# Patient Record
Sex: FEMALE | Race: OTHER | HISPANIC OR LATINO | URBAN - METROPOLITAN AREA
[De-identification: names, ages, dates, MRNs, and addresses within clinical notes are randomized per-mention and may not be internally consistent; named-entity substitution may affect disease eponyms.]

---

## 2017-05-27 ENCOUNTER — EMERGENCY (EMERGENCY)
Facility: HOSPITAL | Age: 27
LOS: 1 days | Discharge: HOME | End: 2017-05-27
Attending: EMERGENCY MEDICINE | Admitting: EMERGENCY MEDICINE
Payer: COMMERCIAL

## 2017-05-27 DIAGNOSIS — J45.901 UNSPECIFIED ASTHMA WITH (ACUTE) EXACERBATION: ICD-10-CM

## 2017-05-27 DIAGNOSIS — Z79.899 OTHER LONG TERM (CURRENT) DRUG THERAPY: ICD-10-CM

## 2017-05-27 PROCEDURE — 99053 MED SERV 10PM-8AM 24 HR FAC: CPT

## 2017-05-27 PROCEDURE — 99284 EMERGENCY DEPT VISIT MOD MDM: CPT | Mod: 25

## 2017-05-28 VITALS
SYSTOLIC BLOOD PRESSURE: 123 MMHG | WEIGHT: 147.71 LBS | RESPIRATION RATE: 18 BRPM | HEIGHT: 60 IN | OXYGEN SATURATION: 98 % | TEMPERATURE: 99 F | DIASTOLIC BLOOD PRESSURE: 75 MMHG | HEART RATE: 78 BPM

## 2017-05-28 PROCEDURE — 99283 EMERGENCY DEPT VISIT LOW MDM: CPT | Mod: 25

## 2017-05-28 PROCEDURE — 94640 AIRWAY INHALATION TREATMENT: CPT

## 2017-05-28 RX ORDER — ALBUTEROL 90 UG/1
3 AEROSOL, METERED ORAL
Qty: 0 | Refills: 0 | COMMUNITY

## 2017-05-28 RX ORDER — ALBUTEROL 90 UG/1
2 AEROSOL, METERED ORAL
Qty: 1 | Refills: 0 | OUTPATIENT
Start: 2017-05-28 | End: 2017-06-04

## 2017-05-28 RX ORDER — IPRATROPIUM/ALBUTEROL SULFATE 18-103MCG
3 AEROSOL WITH ADAPTER (GRAM) INHALATION ONCE
Qty: 0 | Refills: 0 | Status: COMPLETED | OUTPATIENT
Start: 2017-05-28 | End: 2017-05-28

## 2017-05-28 RX ADMIN — Medication 60 MILLIGRAM(S): at 00:21

## 2017-05-28 RX ADMIN — Medication 3 MILLILITER(S): at 00:21

## 2017-05-28 NOTE — ED ADULT NURSE NOTE - OBJECTIVE STATEMENT
Patient to the ED for worsening SOB and difficulty breathing beginning 4 days ago.  Patient has history of asthma.  Associated symptoms to include back pain secondary to forcing to breath deeply.  Denies fever chills chest pain.

## 2017-05-28 NOTE — ED PROVIDER NOTE - MEDICAL DECISION MAKING DETAILS
asthma exac  pk flow 370 - states subjective fever -pollen getting to her asthma exac  pk flow 370 - states subjective fever -pollen getting to her much improved in ED will dc  pk flow > 450  no further wheezing

## 2017-05-28 NOTE — ED PROVIDER NOTE - OBJECTIVE STATEMENT
28 yo female with hx of asthma- 1 prior intubation 1 month ago-  no current steroids- now with 1 day of wheezing cough - clear sputum no cp or sob  no leg swelling or calf tenderness  no prior hx of DVT or PE  non smoker -was using her inhaler all day

## 2018-08-27 NOTE — ED ADULT NURSE NOTE - PAIN: PRESENCE, MLM
complains of pain/discomfort Principal Discharge DX:	Viral pneumonia  Goal:	symptom management, follow up with primary care doctor within 1 week of discharge  Assessment and plan of treatment:	You were admitted to the hospital for a productive cough and fever and found to have a viral respiratory illness called entero/rhinovirus. This was the cause of all of your symptoms. Because of the viral illness you were having wheezing and shortness of breath relieved with inhalers. You were prescribed these inhalers for home to help with symptoms until your respiratory illness resolves. Please use these as prescribed and follow up with your primary care doctor within 1 weeks of discharge for further monitoring.  Secondary Diagnosis:	SLE (systemic lupus erythematosus)  Goal:	follow up with primary care doctor within 1 week of discharge  Assessment and plan of treatment:	According to your pharmacy the last time your plaquenil was filled was in June.  Secondary Diagnosis:	HTN (hypertension)  Goal:	follow up with primary care doctor within 1 week of discharge  Secondary Diagnosis:	Depression  Goal:	follow up with primary care doctor within 1 week of discharge Principal Discharge DX:	Viral pneumonia  Goal:	symptom management, follow up with primary care doctor within 1 week of discharge  Assessment and plan of treatment:	You were admitted to the hospital for a productive cough and fever and found to have a viral respiratory illness called entero/rhinovirus. This was the cause of all of your symptoms. Because of the viral illness you were having wheezing and shortness of breath relieved with inhalers. You were prescribed these inhalers for home to help with symptoms until your respiratory illness resolves. Please use these as prescribed and follow up with your primary care doctor within 1 weeks of discharge for further monitoring.  Secondary Diagnosis:	SLE (systemic lupus erythematosus)  Goal:	follow up with primary care doctor within 1 week of discharge  Assessment and plan of treatment:	Please continue to take your plaquenil  Secondary Diagnosis:	HTN (hypertension)  Goal:	follow up with primary care doctor within 1 week of discharge  Assessment and plan of treatment:	Please continue to take your home medications as prescribed (enalapril, metoprolol) and follow a low salt diet. Please follow up with your primary care doctor within 1 week of discharge for further monitoring and adjustment of your medications.  Secondary Diagnosis:	Depression  Goal:	follow up with primary care doctor within 1 week of discharge  Assessment and plan of treatment:	Please continue to take your medications as prescribed. If you experience any increased symptoms, have any thoughts of hurting yourself or others please contact your doctor immediately or come to the hospital. Principal Discharge DX:	Viral pneumonia  Goal:	symptom management, follow up with primary care doctor within 1 week of discharge  Assessment and plan of treatment:	You were admitted to the hospital for a productive cough and fever and found to have a viral respiratory illness called entero/rhinovirus. This was the cause of all of your symptoms. Because of the viral illness you were having wheezing and shortness of breath relieved with inhalers. You were prescribed these inhalers for home to help with symptoms until your respiratory illness resolves. Please use these as prescribed and follow up with your primary care doctor within 1 weeks of discharge for further monitoring.  Secondary Diagnosis:	SLE (systemic lupus erythematosus)  Goal:	follow up with primary care doctor within 1 week of discharge  Assessment and plan of treatment:	It is unclear whether you should be on plaquenil for your lupus. According to your pharmacy and primary care doctor it was not prescribed since June 2018 and that was for a 1 month supply. Please follow up with your primary care doctor within 1 week regarding resuming your plaquenil and for a new prescription if needed.  Secondary Diagnosis:	HTN (hypertension)  Goal:	follow up with primary care doctor within 1 week of discharge  Assessment and plan of treatment:	Please continue to take your home medications as prescribed (enalapril, metoprolol) and follow a low salt diet. Please follow up with your primary care doctor within 1 week of discharge for further monitoring and adjustment of your medications.  Secondary Diagnosis:	Depression  Goal:	follow up with primary care doctor within 1 week of discharge  Assessment and plan of treatment:	Please continue to take your medications as prescribed. If you experience any increased symptoms, have any thoughts of hurting yourself or others please contact your doctor immediately or come to the hospital.  Secondary Diagnosis:	Hypokalemia  Assessment and plan of treatment:	You had a low potassium level while in the hospital requiring you to receive potassium supplementation. please follow up with your primary care doctor within 1 week of discharge to ensure your potassium levels are within a normal range. If you experience any increased weakness please contact your doctor immediately.

## 2021-09-05 ENCOUNTER — EMERGENCY (EMERGENCY)
Facility: HOSPITAL | Age: 31
LOS: 1 days | Discharge: HOME | End: 2021-09-05
Attending: EMERGENCY MEDICINE | Admitting: EMERGENCY MEDICINE
Payer: COMMERCIAL

## 2021-09-05 VITALS
DIASTOLIC BLOOD PRESSURE: 80 MMHG | TEMPERATURE: 98 F | SYSTOLIC BLOOD PRESSURE: 114 MMHG | WEIGHT: 179.9 LBS | HEIGHT: 60 IN | OXYGEN SATURATION: 98 % | RESPIRATION RATE: 16 BRPM | HEART RATE: 66 BPM

## 2021-09-05 VITALS
RESPIRATION RATE: 16 BRPM | TEMPERATURE: 98 F | OXYGEN SATURATION: 100 % | SYSTOLIC BLOOD PRESSURE: 111 MMHG | DIASTOLIC BLOOD PRESSURE: 76 MMHG | HEART RATE: 62 BPM

## 2021-09-05 PROBLEM — J45.909 UNSPECIFIED ASTHMA, UNCOMPLICATED: Chronic | Status: ACTIVE | Noted: 2017-05-28

## 2021-09-05 LAB
ALBUMIN SERPL ELPH-MCNC: 4.2 G/DL — SIGNIFICANT CHANGE UP (ref 3.3–5)
ALP SERPL-CCNC: 145 U/L — HIGH (ref 40–120)
ALT FLD-CCNC: 31 U/L — SIGNIFICANT CHANGE UP (ref 10–45)
ANION GAP SERPL CALC-SCNC: 14 MMOL/L — SIGNIFICANT CHANGE UP (ref 5–17)
AST SERPL-CCNC: 24 U/L — SIGNIFICANT CHANGE UP (ref 10–40)
BASOPHILS # BLD AUTO: 0.04 K/UL — SIGNIFICANT CHANGE UP (ref 0–0.2)
BASOPHILS NFR BLD AUTO: 0.7 % — SIGNIFICANT CHANGE UP (ref 0–2)
BILIRUB SERPL-MCNC: 0.3 MG/DL — SIGNIFICANT CHANGE UP (ref 0.2–1.2)
BUN SERPL-MCNC: 15 MG/DL — SIGNIFICANT CHANGE UP (ref 7–23)
CALCIUM SERPL-MCNC: 10 MG/DL — SIGNIFICANT CHANGE UP (ref 8.4–10.5)
CHLORIDE SERPL-SCNC: 98 MMOL/L — SIGNIFICANT CHANGE UP (ref 96–108)
CO2 SERPL-SCNC: 29 MMOL/L — SIGNIFICANT CHANGE UP (ref 22–31)
CREAT SERPL-MCNC: 0.98 MG/DL — SIGNIFICANT CHANGE UP (ref 0.5–1.3)
EOSINOPHIL # BLD AUTO: 0.13 K/UL — SIGNIFICANT CHANGE UP (ref 0–0.5)
EOSINOPHIL NFR BLD AUTO: 2.2 % — SIGNIFICANT CHANGE UP (ref 0–6)
GLUCOSE SERPL-MCNC: 94 MG/DL — SIGNIFICANT CHANGE UP (ref 70–99)
HCG SERPL-ACNC: <0 MIU/ML — SIGNIFICANT CHANGE UP
HCT VFR BLD CALC: 39 % — SIGNIFICANT CHANGE UP (ref 34.5–45)
HGB BLD-MCNC: 12.2 G/DL — SIGNIFICANT CHANGE UP (ref 11.5–15.5)
IMM GRANULOCYTES NFR BLD AUTO: 0 % — SIGNIFICANT CHANGE UP (ref 0–1.5)
LIDOCAIN IGE QN: 49 U/L — SIGNIFICANT CHANGE UP (ref 7–60)
LYMPHOCYTES # BLD AUTO: 1.9 K/UL — SIGNIFICANT CHANGE UP (ref 1–3.3)
LYMPHOCYTES # BLD AUTO: 31.7 % — SIGNIFICANT CHANGE UP (ref 13–44)
MCHC RBC-ENTMCNC: 24.5 PG — LOW (ref 27–34)
MCHC RBC-ENTMCNC: 31.3 GM/DL — LOW (ref 32–36)
MCV RBC AUTO: 78.3 FL — LOW (ref 80–100)
MONOCYTES # BLD AUTO: 0.72 K/UL — SIGNIFICANT CHANGE UP (ref 0–0.9)
MONOCYTES NFR BLD AUTO: 12 % — SIGNIFICANT CHANGE UP (ref 2–14)
NEUTROPHILS # BLD AUTO: 3.2 K/UL — SIGNIFICANT CHANGE UP (ref 1.8–7.4)
NEUTROPHILS NFR BLD AUTO: 53.4 % — SIGNIFICANT CHANGE UP (ref 43–77)
NRBC # BLD: 0 /100 WBCS — SIGNIFICANT CHANGE UP (ref 0–0)
PLATELET # BLD AUTO: 407 K/UL — HIGH (ref 150–400)
POTASSIUM SERPL-MCNC: 3.5 MMOL/L — SIGNIFICANT CHANGE UP (ref 3.5–5.3)
POTASSIUM SERPL-SCNC: 3.5 MMOL/L — SIGNIFICANT CHANGE UP (ref 3.5–5.3)
PROT SERPL-MCNC: 8.6 G/DL — HIGH (ref 6–8.3)
RBC # BLD: 4.98 M/UL — SIGNIFICANT CHANGE UP (ref 3.8–5.2)
RBC # FLD: 13.2 % — SIGNIFICANT CHANGE UP (ref 10.3–14.5)
SODIUM SERPL-SCNC: 141 MMOL/L — SIGNIFICANT CHANGE UP (ref 135–145)
WBC # BLD: 5.99 K/UL — SIGNIFICANT CHANGE UP (ref 3.8–10.5)
WBC # FLD AUTO: 5.99 K/UL — SIGNIFICANT CHANGE UP (ref 3.8–10.5)

## 2021-09-05 PROCEDURE — 83690 ASSAY OF LIPASE: CPT

## 2021-09-05 PROCEDURE — 84702 CHORIONIC GONADOTROPIN TEST: CPT

## 2021-09-05 PROCEDURE — 85025 COMPLETE CBC W/AUTO DIFF WBC: CPT

## 2021-09-05 PROCEDURE — 80053 COMPREHEN METABOLIC PANEL: CPT

## 2021-09-05 PROCEDURE — 99284 EMERGENCY DEPT VISIT MOD MDM: CPT | Mod: 25

## 2021-09-05 PROCEDURE — 96374 THER/PROPH/DIAG INJ IV PUSH: CPT

## 2021-09-05 PROCEDURE — 99284 EMERGENCY DEPT VISIT MOD MDM: CPT

## 2021-09-05 PROCEDURE — 36415 COLL VENOUS BLD VENIPUNCTURE: CPT

## 2021-09-05 RX ORDER — METOCLOPRAMIDE HCL 10 MG
1 TABLET ORAL
Qty: 20 | Refills: 0
Start: 2021-09-05

## 2021-09-05 RX ORDER — METOCLOPRAMIDE HCL 10 MG
10 TABLET ORAL ONCE
Refills: 0 | Status: DISCONTINUED | OUTPATIENT
Start: 2021-09-05 | End: 2021-09-05

## 2021-09-05 RX ORDER — SODIUM CHLORIDE 9 MG/ML
1000 INJECTION INTRAMUSCULAR; INTRAVENOUS; SUBCUTANEOUS ONCE
Refills: 0 | Status: COMPLETED | OUTPATIENT
Start: 2021-09-05 | End: 2021-09-05

## 2021-09-05 RX ORDER — METOCLOPRAMIDE HCL 10 MG
10 TABLET ORAL ONCE
Refills: 0 | Status: COMPLETED | OUTPATIENT
Start: 2021-09-05 | End: 2021-09-05

## 2021-09-05 RX ADMIN — Medication 104 MILLIGRAM(S): at 08:45

## 2021-09-05 RX ADMIN — SODIUM CHLORIDE 1000 MILLILITER(S): 9 INJECTION INTRAMUSCULAR; INTRAVENOUS; SUBCUTANEOUS at 08:44

## 2021-09-05 NOTE — ED ADULT NURSE NOTE - BOWEL SOUNDS LUQ
Problem: Non-Pressure Injury Wound  Goal: # No deterioration in wound  Outcome: Outcome Met, Continue evaluating goal progress toward completion  Patient has new areas of induration and drainage.  MD came and talked with patient about how to manage drainage and what would work best to prevent flare ups.  Instructions given to patient as well as some supplies.       
present

## 2021-09-05 NOTE — ED ADULT TRIAGE NOTE - CHIEF COMPLAINT QUOTE
Pt BIBA from home CO N/V x1 week.  PT states "I had gastric balloon surgery on 8/20."  Pt denies SOB, Fevers, CP, Dizziness.

## 2021-09-05 NOTE — ED PROVIDER NOTE - PROGRESS NOTE DETAILS
labs wnl, pt feeling better, tolerating PO.  will d/c, to f/u with her bariatric surgeon, return to ED if sx worsen.

## 2021-09-05 NOTE — ED PROVIDER NOTE - CLINICAL SUMMARY MEDICAL DECISION MAKING FREE TEXT BOX
32 y/o f presents s/p gastric balloon procedure 8/20/21 c/o persistent nausea, intermittent vomiting over the past 2 weeks.  VSS in ED, pt with no pain during this time and nontender abd in ED.  Do not suspect obstruction, pt tolerates PO majority of time, vomiting is intermittent.  Will check labs, given IV fluid and reglan.  F/u results and reassess.

## 2021-09-05 NOTE — ED ADULT TRIAGE NOTE - INTERNATIONAL TRAVEL
No
No. MARGARITO screening performed.  STOP BANG Legend: 0-2 = LOW Risk; 3-4 = INTERMEDIATE Risk; 5-8 = HIGH Risk

## 2021-09-05 NOTE — ED PROVIDER NOTE - NSFOLLOWUPINSTRUCTIONS_ED_ALL_ED_FT
Náuseas y vómitos agudos    LO QUE NECESITA SABER:    ¿Qué son las náuseas y vómitos agudos?Las náuseas y los vómitos agudos comienzan de forma repentina, empeoran rápidamente y carreon un período breve de tiempo.    ¿Cuáles son algunas causas frecuentes de las náuseas y los vómitos agudos?  •La intoxicación alimentaria      •Grandes cantidades de alcohol      •Ciertos medicamentos, demasiada cantidad de cualquier medicamento o la interrupción repentina de un medicamento regular      •[place] tempranas del embarazo      •Infección en el estómago, los intestinos u otros órganos      •Traumatismo en la stiven      •Ansiedad o estrés      •Gastroparesia (almaz condición de marlon que impide que el estómago se vacíe adecuadamente)      •Trastornos metabólicos, manuela uremia o insuficiencia suprarrenal      ¿Cuáles son las causas de las náuseas y vómitos agudos con dolor de estómago?  •Inflamación del apéndice, la vesícula biliar, el estómago, el páncreas, los riñones y otros órganos      •Cálculos biliares      •Almaz bacteria o parásito en el aparato digestivo      •[place] cardíaco      •Úlceras estomacales u obstrucción o contorsión intestinal      ¿Cuáles son las causas de las náuseas y vómitos agudos con otros signos y síntomas?Usted podría sudar y tener la piel pálida, tener problemas digestivos y salivar más que de costumbre. Es posible que estos signos y síntomas ian el resultado de lo siguiente:  •Problemas con bailey pulso, flujo hacia el músculo del corazón, presión arterial, la zacarias o líquido del estómago      •Aumento de la presión o hemorragia en el cerebro      •Inflamación del tejido que recubre el cerebro      •Migrañas o convulsiones      •Trastornos del oído interno que causan problemas de equilibrio      ¿Cómo se diagnostica la causa de las náuseas y los vómitos agudos?Bailey médico lo examinará y le preguntará sobre bailey historial médico. Informe a bailey médico acerca de cualquier otro signo y síntoma que tenga. Informe a bailey médico cuándo tuvo náuseas y vómitos por última vez y cuánto duraron. Indique si sucedió antes, raymundo o después de comer, y cuánto comió. Bailey médico necesitará saber cuánto salió cuando vomitó, y si fue rápido y contundente. Dígale a bailey médico si bailey vómito olía a evacuación intestinal o contenía zacarias o alimentos. Dígale a bailey médico si el vómito era de color amarillo brillante. Es posible que usted necesite alguno de los siguientes:   •Los análisis de sangrese usan para detectar almaz infección o inflamación.      •Imágenes por radiografía, por resonancia magnética o por tomografía computarizadase pueden usar para detectar almaz lesión o un bloqueo.      ¿Cómo se tratan las náuseas y vómitos agudos?El primer objetivo del tratamiento para las náuseas y los vómitos es prevenir o tratar la deshidratación. El tratamiento también dependerá de la causa de las náuseas y vómitos. También se tratará cualquier condición médica que esté causando las náuseas y los vómitos. La meta del tratamiento también es detener o prevenir carol signos y síntomas. Usted podría necesitar paul o más de los siguientes:  •Los medicamentosse puede administrar para calmarle el estómago y detener carol vómitos. Usted también puede necesitar medicamentos para ayudarlo a sentirse más relajado o para detener las náuseas y los vómitos causados por el mareo por movimiento. Pueden usarse estimulantes gastrointestinales para ayudar a vaciar bailey estómago y los intestinos. [place] puede ayudar a disminuir las náuseas y los vómitos.      •Líquidos por vía intravenosa (IV)podrían administrarse para reemplazar los electrolitos y fluidos perdidos. [place] puede ser necesario si no puede beber líquidos.      •Sonda nasogástrica (NG):Se coloca almaz sonda NG dentro de bailey nariz, y pasa por bailey garganta hasta que llega al estómago. Se puede administrar medicamento o alimento a través de la sonda nasogástrica, si usted no puede cherly nada por la boca. Si en vez de esto, el médico necesita mantener bailey estómago vacío, podría conectar el tubo a almaz máquina de succión.      ¿Qué puedo hacer para prevenir o tratar las náuseas y los vómitos agudos?  •No consuma alcohol.El alcohol podría causarle malestar o irritación estomacal. Almaz cantidad elevada de alcohol también puede causar náuseas y vómitos agudos.      •Controle el estrés.Los aldo de stiven que son el resultado de tensión nerviosa pueden causar náuseas y vómitos. Busque la manera de relajarse y controlar el estrés. Descanse y duerma más.      •[place] más líquidos manuela se le indique.Los vómitos pueden llevar a la deshidratación. Es importante beber más líquidos para ayudar a reemplazar los fluidos corporales perdidos. Pregunte a bailey médico sobre la cantidad de líquido que necesita cheryl todos los sylvia y cuáles le recomienda. Bailey médico podría recomendarle que tome almaz solución de rehidratación oral (SRO). Las soluciones de rehidratación oral contienen la cantidad adecuada de agua, sales y azúcar que necesita para restituir los líquidos que perdió bailey organismo. Pregunte qué tipo de solución de rehidratación oral debe usar, qué cantidad debe cheryl y dónde puede obtenerla.      •Ingiera comidas más pequeñas, más a menudo.Coma pequeñas cantidades de comida cada 2 o 3 horas, incluso si no tiene hambre. [place] náuseas podrían disminuir si tiene comida en el estómago.      •Hable con bailey médico antes de cheryl medicamentos de venta francesca.Estos medicamentos pueden causar problemas serios si los usa junto con ciertos medicamentos o si tiene determinadas condiciones médicas. Podrían tener problemas si usa almaz dosis demasiado iza o los usa raymundo más tiempo de lo indicado. Siga las indicaciones de la etiqueta al pie de la letra.      ¿Cuándo jovanni buscar atención inmediata?  •Usted nota zacarias en bailey vómito o en carol evacuaciones.      •Usted siente un dolor súbito e intenso en el pecho y la parte superior de bailey abdomen después de tener vómitos gayathri o tratar de vomitar.      •Usted tiene el lenka y el pecho inflamados.      •Usted está mareado, tiene frío, sed y sequedad en los ojos y la boca.      •Usted está orinando muy poco o nada en absoluto.      •Usted tiene debilidad muscular, calambres en las piernas y dificultad para respirar.      •Bailey corazón late más rápido que de costumbre.      •Usted continúa vomitando por más de 48 horas.      ¿Cuándo jovanni comunicarme con mi médico?  •Usted tiene arcadas secas (vómitos sin que salga nada) frecuentes.      •Carol náusea y vómitos no mejoran ni desaparecen después de usar el medicamento.      •Usted tiene preguntas o inquietudes acerca de bailey condición o cuidado.      ACUERDOS SOBRE BAILEY CUIDADO:    Usted tiene el derecho de ayudar a planear bailey cuidado. Aprenda todo lo que pueda sobre bailey condición y manuela darle tratamiento. Discuta carol opciones de tratamiento con carol médicos para decidir el cuidado que usted desea recibir. Usted siempre tiene el derecho de rechazar el tratamiento.

## 2021-09-05 NOTE — ED PROVIDER NOTE - OBJECTIVE STATEMENT
30 y/o f s/p gastric balloon 8/20/21 presents c/o persistent nausea and vomiting over the past 2 weeks.  Pt stating since her procedure she has been nauseous, intermittently vomiting.  Pt states she has been in contact with the bariatric group that did the procedure, was put on zofran which hasn't helped.  Pt stating throughout the day she usually is nauseous but able to tolerate her shakes although in the evening she reports she will vomit, sometimes in the morning as well.  Pt stating today she feels weak, low energy, thinks she may be dehydrated.  Pt reports she will f/u with her bariatric group tomorrow.  Denies fever, chills, diarrhea, abd pain, all other ROS negative.

## 2021-09-05 NOTE — ED PROVIDER NOTE - ATTENDING CONTRIBUTION TO CARE
30 yo , ho of gastric balloon Aug 20  since then persistent nausea, intermittently vomiting at end of day ,   no pain,  abd soft NT,   L WNL , feels better after Reglan,   pt tolerating PO  stable for d/c and outpt f/u .

## 2021-09-05 NOTE — ED PROVIDER NOTE - PATIENT PORTAL LINK FT
You can access the FollowMyHealth Patient Portal offered by [facility] by registering at the following website: [email or web address]. By joining [employer]’s FollowMyHealth portal, you will also be able to view your health information using other applications (apps) compatible with our system.

## 2021-09-05 NOTE — ED ADULT NURSE NOTE - BREATHING, MLM
Telephone Encounter by Brittany Sellers RN at 04/03/18 12:57 PM     Author:  Brittany Sellers RN Service:  (none) Author Type:  Registered Nurse     Filed:  04/03/18 01:00 PM Encounter Date:  3/29/2018 Status:  Signed     :  Brittany Sellers RN (Registered Nurse)            Patient refused stress test.  Sent to Dr Covarrubias's team as fyi and to cancel order.  FYI sent to PCP.[MB1.1M]      Revision History        User Key Date/Time User Provider Type Action    > MB1.1 04/03/18 01:00 PM Brittany Sellers, RN Registered Nurse Sign    M - Manual             Spontaneous, unlabored and symmetrical

## 2021-09-05 NOTE — ED ADULT NURSE NOTE - OBJECTIVE STATEMENT
Patient presents alert and oriented to person, place, time, and situation to ED c/o nausea and vomiting for 1 week. Patient received Gastric Balloon surgery on 8/20/21. Patient states that the vomiting usually happens after she has her meal replacement liquid. Patient denies abdominal pain, chest pain, SOB, fever, dizziness, or diarrhea. Patient resting in bed at this time in no acute distress, speaking in full sentences without difficulty. Patient denies PMH, no other PSHx besides gastric balloon surgery, and NKA status confirmed.

## 2021-09-08 DIAGNOSIS — R11.2 NAUSEA WITH VOMITING, UNSPECIFIED: ICD-10-CM

## 2025-03-11 NOTE — ED PROVIDER NOTE - CONDITION AT DISCHARGE:
Less than or equal to 18 breaths Less than or equal to 18 breaths Less than or equal to 18 breaths Improved